# Patient Record
Sex: FEMALE | Race: BLACK OR AFRICAN AMERICAN | Employment: UNEMPLOYED | URBAN - METROPOLITAN AREA
[De-identification: names, ages, dates, MRNs, and addresses within clinical notes are randomized per-mention and may not be internally consistent; named-entity substitution may affect disease eponyms.]

---

## 2023-04-26 ENCOUNTER — HOSPITAL ENCOUNTER (EMERGENCY)
Age: 18
Discharge: HOME | End: 2023-04-27
Payer: COMMERCIAL

## 2023-04-26 DIAGNOSIS — R11.2 NAUSEA AND VOMITING, UNSPECIFIED VOMITING TYPE: Primary | ICD-10-CM

## 2023-04-26 LAB
ALBUMIN SERPL-MCNC: 5.1 G/DL (ref 3.5–5.2)
ALP SERPL-CCNC: 71 U/L (ref 35–104)
ALT SERPL-CCNC: <5 U/L (ref 0–32)
ANION GAP SERPL CALCULATED.3IONS-SCNC: 14 MMOL/L (ref 7–16)
AST SERPL-CCNC: 14 U/L (ref 0–31)
BACTERIA URNS QL MICRO: ABNORMAL /HPF
BASOPHILS # BLD: 0 E9/L (ref 0–0.2)
BASOPHILS NFR BLD: 0.3 % (ref 0–2)
BILIRUB SERPL-MCNC: 0.7 MG/DL (ref 0–1.2)
BILIRUB UR QL STRIP: NEGATIVE
BUN SERPL-MCNC: 14 MG/DL (ref 6–20)
CALCIUM SERPL-MCNC: 9.8 MG/DL (ref 8.6–10.2)
CHLORIDE SERPL-SCNC: 102 MMOL/L (ref 98–107)
CLARITY UR: CLEAR
CO2 SERPL-SCNC: 22 MMOL/L (ref 22–29)
COLOR UR: YELLOW
CREAT SERPL-MCNC: 0.5 MG/DL (ref 0.5–1)
EOSINOPHIL # BLD: 0 E9/L (ref 0.05–0.5)
EOSINOPHIL NFR BLD: 0.4 % (ref 0–6)
ERYTHROCYTE [DISTWIDTH] IN BLOOD BY AUTOMATED COUNT: 11.8 FL (ref 11.5–15)
GLUCOSE SERPL-MCNC: 93 MG/DL (ref 74–99)
GLUCOSE UR STRIP-MCNC: NEGATIVE MG/DL
HCG, URINE, POC: NEGATIVE
HCT VFR BLD AUTO: 41 % (ref 34–48)
HGB BLD-MCNC: 13.3 G/DL (ref 11.5–15.5)
HGB UR QL STRIP: ABNORMAL
KETONES UR STRIP-MCNC: 40 MG/DL
LACTATE BLDV-SCNC: 1.6 MMOL/L (ref 0.5–2.2)
LEUKOCYTE ESTERASE UR QL STRIP: NEGATIVE
LIPASE: 23 U/L (ref 13–60)
LYMPHOCYTES # BLD: 0.15 E9/L (ref 1.5–4)
LYMPHOCYTES NFR BLD: 1.7 % (ref 20–42)
Lab: NORMAL
MCH RBC QN AUTO: 27.9 PG (ref 26–35)
MCHC RBC AUTO-ENTMCNC: 32.4 % (ref 32–34.5)
MCV RBC AUTO: 86 FL (ref 80–99.9)
MONOCYTES # BLD: 0 E9/L (ref 0.1–0.95)
MONOCYTES NFR BLD: 2.5 % (ref 2–12)
MUCOUS THREADS URNS QL MICRO: PRESENT /LPF
NEGATIVE QC PASS/FAIL: NORMAL
NEUTROPHILS # BLD: 7.35 E9/L (ref 1.8–7.3)
NEUTS SEG NFR BLD: 98.3 % (ref 43–80)
NITRITE UR QL STRIP: NEGATIVE
OVALOCYTES: ABNORMAL
PH UR STRIP: 5.5 [PH] (ref 5–9)
PLATELET # BLD AUTO: 245 E9/L (ref 130–450)
PMV BLD AUTO: 9.6 FL (ref 7–12)
POIKILOCYTES: ABNORMAL
POSITIVE QC PASS/FAIL: NORMAL
POTASSIUM SERPL-SCNC: 4.1 MMOL/L (ref 3.5–5)
PROT SERPL-MCNC: 8.5 G/DL (ref 6.4–8.3)
PROT UR STRIP-MCNC: ABNORMAL MG/DL
RBC # BLD AUTO: 4.77 E12/L (ref 3.5–5.5)
RBC #/AREA URNS HPF: ABNORMAL /HPF (ref 0–2)
SODIUM SERPL-SCNC: 138 MMOL/L (ref 132–146)
SP GR UR STRIP: >=1.03 (ref 1–1.03)
TEAR DROP CELLS: ABNORMAL
UROBILINOGEN UR STRIP-ACNC: 0.2 E.U./DL
WBC # BLD: 7.5 E9/L (ref 4.5–11.5)
WBC #/AREA URNS HPF: ABNORMAL /HPF (ref 0–5)

## 2023-04-26 PROCEDURE — 83605 ASSAY OF LACTIC ACID: CPT

## 2023-04-26 PROCEDURE — 85025 COMPLETE CBC W/AUTO DIFF WBC: CPT

## 2023-04-26 PROCEDURE — 6360000002 HC RX W HCPCS: Performed by: NURSE PRACTITIONER

## 2023-04-26 PROCEDURE — 96374 THER/PROPH/DIAG INJ IV PUSH: CPT

## 2023-04-26 PROCEDURE — 2580000003 HC RX 258: Performed by: NURSE PRACTITIONER

## 2023-04-26 PROCEDURE — 99284 EMERGENCY DEPT VISIT MOD MDM: CPT

## 2023-04-26 PROCEDURE — A4216 STERILE WATER/SALINE, 10 ML: HCPCS | Performed by: NURSE PRACTITIONER

## 2023-04-26 PROCEDURE — 6370000000 HC RX 637 (ALT 250 FOR IP): Performed by: NURSE PRACTITIONER

## 2023-04-26 PROCEDURE — 80053 COMPREHEN METABOLIC PANEL: CPT

## 2023-04-26 PROCEDURE — 83690 ASSAY OF LIPASE: CPT

## 2023-04-26 PROCEDURE — 2500000003 HC RX 250 WO HCPCS: Performed by: NURSE PRACTITIONER

## 2023-04-26 PROCEDURE — 81001 URINALYSIS AUTO W/SCOPE: CPT

## 2023-04-26 PROCEDURE — 96375 TX/PRO/DX INJ NEW DRUG ADDON: CPT

## 2023-04-26 RX ORDER — 0.9 % SODIUM CHLORIDE 0.9 %
1000 INTRAVENOUS SOLUTION INTRAVENOUS ONCE
Status: COMPLETED | OUTPATIENT
Start: 2023-04-26 | End: 2023-04-27

## 2023-04-26 RX ORDER — ONDANSETRON 4 MG/1
4 TABLET, ORALLY DISINTEGRATING ORAL ONCE
Status: COMPLETED | OUTPATIENT
Start: 2023-04-26 | End: 2023-04-26

## 2023-04-26 RX ORDER — ONDANSETRON 2 MG/ML
4 INJECTION INTRAMUSCULAR; INTRAVENOUS ONCE
Status: COMPLETED | OUTPATIENT
Start: 2023-04-26 | End: 2023-04-26

## 2023-04-26 RX ADMIN — FAMOTIDINE 20 MG: 10 INJECTION, SOLUTION INTRAVENOUS at 23:16

## 2023-04-26 RX ADMIN — SODIUM CHLORIDE 1000 ML: 9 INJECTION, SOLUTION INTRAVENOUS at 23:15

## 2023-04-26 RX ADMIN — ONDANSETRON 4 MG: 2 INJECTION INTRAMUSCULAR; INTRAVENOUS at 23:16

## 2023-04-26 RX ADMIN — ONDANSETRON 4 MG: 4 TABLET, ORALLY DISINTEGRATING ORAL at 20:53

## 2023-04-26 ASSESSMENT — LIFESTYLE VARIABLES
HOW OFTEN DO YOU HAVE A DRINK CONTAINING ALCOHOL: NEVER
HOW MANY STANDARD DRINKS CONTAINING ALCOHOL DO YOU HAVE ON A TYPICAL DAY: PATIENT DOES NOT DRINK

## 2023-04-26 ASSESSMENT — PAIN - FUNCTIONAL ASSESSMENT: PAIN_FUNCTIONAL_ASSESSMENT: NONE - DENIES PAIN

## 2023-04-27 VITALS
DIASTOLIC BLOOD PRESSURE: 74 MMHG | HEART RATE: 98 BPM | WEIGHT: 107.25 LBS | SYSTOLIC BLOOD PRESSURE: 119 MMHG | TEMPERATURE: 98.7 F | OXYGEN SATURATION: 99 % | BODY MASS INDEX: 19.74 KG/M2 | RESPIRATION RATE: 16 BRPM | HEIGHT: 62 IN

## 2023-04-27 RX ORDER — DICYCLOMINE HCL 20 MG
20 TABLET ORAL 3 TIMES DAILY PRN
Qty: 8 TABLET | Refills: 0 | Status: SHIPPED | OUTPATIENT
Start: 2023-04-27

## 2023-04-27 RX ORDER — ONDANSETRON 4 MG/1
4 TABLET, FILM COATED ORAL EVERY 8 HOURS PRN
Qty: 12 TABLET | Refills: 0 | Status: SHIPPED | OUTPATIENT
Start: 2023-04-27 | End: 2023-05-02

## 2023-04-27 NOTE — ED NOTES
Department of Emergency Medicine  FIRST PROVIDER TRIAGE NOTE             Independent MLP           4/26/23  8:32 PM EDT    Date of Encounter: 4/26/23   MRN: [id number]      HPI: Annabelle White is a 21 y.o. female who presents to the ED for Emesis and Nausea       ROS: Negative for cp or sob. PE: Gen Appearance/Constitutional: alert  HEENT: NC/NT. PERRLA,  Airway patent. Initial Plan of Care: All treatment areas with department are currently occupied. Plan to order/Initiate the following while awaiting opening in ED: labs and EKG.   Initiate Treatment-Testing, Proceed toTreatment Area When Bed Available for ED Attending/MLP to Continue Care    Electronically signed by ALEX Washington CNP   DD: 4/26/23       ALEX Washington CNP  04/26/23 2032       ALEX Washington CNP  04/26/23 2044

## 2023-04-27 NOTE — ED PROVIDER NOTES
independent  HPI:  4/26/23, Time: 10:47 PM EDT         Annabelle White is a 21 y.o. female presenting to the ED for 1 day history of nausea and vomiting. Patient presents to the emergency department with nausea and vomiting that started today. She does report that other family members are at home sick with similar symptoms. She denies any COVID or influenza exposure. Denies any abdominal pain with this. Denies any concern for pregnancy. She also denies any blood in her urine or stool or emesis. Patient was symptoms moderate in severity and persistent. Review of Systems:   A complete review of systems was performed and pertinent positives and negatives are stated within HPI, all other systems reviewed and are negative.          --------------------------------------------- PAST HISTORY ---------------------------------------------  Past Medical History:  has no past medical history on file. Past Surgical History:  has no past surgical history on file. Social History:  reports that she has never smoked. She has never used smokeless tobacco. She reports that she does not currently use alcohol. She reports current drug use. Frequency: 3.00 times per week. Drug: Marijuana [place] Diop). Family History: family history is not on file. The patients home medications have been reviewed.     Allergies: Amoxicillin and Penicillin g    -------------------------------------------------- RESULTS -------------------------------------------------  All laboratory and radiology results have been personally reviewed by myself   LABS:  Results for orders placed or performed during the hospital encounter of 04/26/23   CBC with Auto Differential   Result Value Ref Range    WBC 7.5 4.5 - 11.5 E9/L    RBC 4.77 3.50 - 5.50 E12/L    Hemoglobin 13.3 11.5 - 15.5 g/dL    Hematocrit 41.0 34.0 - 48.0 %    MCV 86.0 80.0 - 99.9 fL    MCH 27.9 26.0 - 35.0 pg    MCHC 32.4 32.0 - 34.5 %    RDW 11.8 11.5 - 15.0 fL    Platelets [id number] -